# Patient Record
(demographics unavailable — no encounter records)

---

## 2017-10-17 NOTE — DIAGNOSTIC IMAGING REPORT
CHEST 2 VIEWS ROUTINE



CLINICAL HISTORY: Shortness of breath. Chest pain.     



COMPARISON STUDY:  Chest CT January 20, 2015 and chest radiograph October 5, 2016.



FINDINGS: Postsurgical findings within the left lung are noted. There are lower

cervical surgical clips. No pneumothorax or pleural effusion is present.

Cardiomediastinal silhouette is stable. There is no consolidation to suggest

pneumonia. Pulmonary vascularity is normal. The appearance of the chest is

unchanged. 



IMPRESSION:  No acute cardiopulmonary findings. 









Electronically signed by:  Juarez Barnhart M.D.

10/17/2017 1:31 PM



Dictated Date/Time:  10/17/2017 1:28 PM

## 2018-01-04 NOTE — MAMMOGRAPHY REPORT
BILATERAL DIGITAL SCREENING MAMMOGRAM TOMOSYNTHESIS WITH CAD: 1/3/2018

CLINICAL HISTORY: Routine screening.  





TECHNIQUE:  Breast tomosynthesis in addition to standard 2D mammography was performed. Current study 
was also evaluated with a Computer Aided Detection (CAD) system.  



COMPARISON: Comparison is made to exams dated:  10/18/2016 mammogram, 4/29/2015 mammogram, 10/31/2013
 mammogram, 5/14/2015 mammogram, 2/17/2016 mammogram, and 2/14/2006 mammogram - [facility].   



BREAST COMPOSITION:  There are scattered areas of fibroglandular density in both breasts.  



FINDINGS:  There is a 4.9 mm nodular asymmetry in the slightly medial anterior left breast, best seen
 on CC tomosynthesis slice 41/63.  Although this could represent normal overlapping fibrolandular tis
[place], additional spot compression tomosynthesis views and possible ultrasound are recommended.



The grouping of smooth coarse calcifications measuring 10 mm in the 12:00 to 1:00 posterior right paddy
ast appears stable mammographically dating back to at least 02/17/2016 and can be reassessed at time 
of next annual mammogram. No other suspicious mass, architectural distortion or cluster of microcalci
fications is seen.  



IMPRESSION:  ACR BI-RADS CATEGORY 0: INCOMPLETE EVALUATION:  NEED ADDITIONAL IMAGING EVALUATION

1.  The 4.9 mm nodular asymmetry in the slightly lateral anterior left breast needs additional imagin
g evaluation.

2.  Stable grouping of coarse heterogeneous microcalcifications in the 12:00 to 1:00 posterior right 
breast.

The patient will be called to schedule an appointment.  





Approximately 10% of breast cancers are not detected with mammography. A negative mammographic report
 should not delay biopsy if a clinically suggestive mass is present.



Marilee Dumont M.D.          

ay/:1/3/2018 15:22:34  



Imaging Technologist: Alcides LEON([place])(TIM), [facility]

letter sent: Addl Imaging 0  

BI-RADS Code: ACR BI-RADS Category 0: Incomplete Evaluation:  Need Additional Imaging Evaluation

## 2018-01-17 NOTE — MAMMOGRAPHY REPORT
UNILATERAL LEFT DIGITAL DIAGNOSTIC MAMMOGRAM TOMOSYNTHESIS: 1/17/2018

CLINICAL HISTORY: Callback from screening mammogram for left breast asymmetry.  





TECHNIQUE:  Breast tomosynthesis in addition to standard 2D mammography was performed.  Spot compress
ion left CC and MLO 2-D and tomosynthesis images were obtained.



COMPARISON: Comparison is made to exams dated:  1/3/2018 mammogram, 10/18/2016 mammogram, 4/29/2015 m
ammogram, 10/31/2013 mammogram, 2/14/2006 mammogram, and 12/22/2004 mammogram - [facility].   



BREAST COMPOSITION:  There are scattered areas of fibroglandular density in the left breast.  



FINDINGS: The previously described nodular asymmetry seen within the left lateral anterior breast on 
the cc view effaces to a baseline appearance on the additional spot compression views, and has the ap
pearance of normal fibroglandular tissue on the tomosynthesis images.  The finding is benign and comp
atible with normal fibroglandular tissue.  There are no suspicious masses, calcifications, or areas o
f architectural distortion noted on the additional views.





IMPRESSION:  ACR BI-RADS CATEGORY 2: BENIGN

The left breast asymmetry effaces on the additional views, and is benign and compatible with normal f
ibroglandular tissue.  There is no mammographic evidence of malignancy. A 1 year screening mammogram 
is recommended.  The patient has been verbally notified of the results.  





Approximately 10% of breast cancers are not detected with mammography. A negative mammographic report
 should not delay biopsy if a clinically suggestive mass is present.



Linn Haskins M.D.          

ah/:1/17/2018 13:40:17  



Imaging Technologist: Alcides LEON([place])(M), [facility]

letter sent: Normal 1/2  

BI-RADS Code: ACR BI-RADS Category 2: Benign